# Patient Record
Sex: FEMALE | Race: WHITE
[De-identification: names, ages, dates, MRNs, and addresses within clinical notes are randomized per-mention and may not be internally consistent; named-entity substitution may affect disease eponyms.]

---

## 2019-09-06 ENCOUNTER — HOSPITAL ENCOUNTER (EMERGENCY)
Dept: HOSPITAL 61 - ER | Age: 44
Discharge: HOME | End: 2019-09-06
Payer: COMMERCIAL

## 2019-09-06 VITALS — DIASTOLIC BLOOD PRESSURE: 94 MMHG | SYSTOLIC BLOOD PRESSURE: 142 MMHG

## 2019-09-06 VITALS — BODY MASS INDEX: 39.68 KG/M2 | HEIGHT: 72 IN | WEIGHT: 293 LBS
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
## 2019-09-06 DIAGNOSIS — S60.410A: Primary | ICD-10-CM

## 2019-09-06 DIAGNOSIS — R51: ICD-10-CM
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
## 2019-09-06 DIAGNOSIS — J45.909: ICD-10-CM

## 2019-09-06 DIAGNOSIS — S09.90XA: ICD-10-CM

## 2019-09-06 DIAGNOSIS — Z88.0: ICD-10-CM
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
## 2019-09-06 DIAGNOSIS — M79.652: ICD-10-CM

## 2019-09-06 PROCEDURE — 90715 TDAP VACCINE 7 YRS/> IM: CPT

## 2019-09-06 PROCEDURE — 73552 X-RAY EXAM OF FEMUR 2/>: CPT

## 2019-09-06 PROCEDURE — 99284 EMERGENCY DEPT VISIT MOD MDM: CPT

## 2019-09-06 PROCEDURE — 90471 IMMUNIZATION ADMIN: CPT

## 2019-09-06 PROCEDURE — 70450 CT HEAD/BRAIN W/O DYE: CPT

## 2019-09-06 NOTE — RAD
LEFT FEMUR XRAY

 

History: Fall. Pain left hip.

 

Technique: 2 views left femur.

 

Comparison: None.

 

Findings:

Advanced left hip degenerative changes with marginal osteophyte formation,

subchondral sclerosis and joint space narrowing. Increased sclerosis of 

the femoral head, may represent avascular necrosis. Normal alignment. No 

fracture. Pubic symphysis DJD. Tricompartment knee DJD partially imaged.

 

Impression: 

1.  No acute osseous abnormality.

2.  Increased sclerosis the left femoral head, may represent avascular 

necrosis.

3.  Left hip and knee DJD.

 

Electronically signed by: Neri Canela DO (9/6/2019 2:07 PM) [facility]

## 2019-09-06 NOTE — RAD
CT HEAD WO CONTRAST

 

Indication: Hit head after a fall.

 

Exposure: One or more of the following individualized dose reduction 

techniques were utilized for this examination:  1. Automated exposure 

control  2. Adjustment of the mA and/or kV according to patient size  3. 

Use of iterative reconstruction technique.

 

Technique: Standard imaging without intravenous contrast.

 

Comparison: None

 

FINDINGS:

No evidence of acute intracranial hemorrhage, mass effect, midline shift 

or abnormal extra-axial fluid collection. Gray-white matter distinction is

intact. Ventricles and sulci are symmetric. Visualized orbits 

unremarkable. No significant scalp swelling or hematoma. Partially 

visualized sinuses are clear. No evidence of depressed skull fracture 

although a specific location of tenderness or pain is not known.

 

IMPRESSION: No evidence of acute intracranial hemorrhage.

 

Electronically signed by: Miguel Angel Stephen MD (9/6/2019 2:37 PM) [facility]

## 2019-09-06 NOTE — PHYS DOC
Past Medical History


Past Medical History:  Arthritis, Asthma, Fibromyalgia


Past Surgical History:  Cholecystectomy, Other


Additional Past Surgical Histo:  ankle fracture


Alcohol Use:  None


Drug Use:  None





Adult General


Chief Complaint


Chief Complaint:  MECHANICAL FALL





HPI


HPI





Patient is a 44  year old female who presents with complaining of fall. Patient 

states she had a fall from a standing position when she tripped on a rug and hit

the back of her head and right hand and left thigh without loss of 

consciousness. Patient rated her pain 7/10 and denies focal neuro deficit, 

nausea and vomiting, pregnancy, fever and chills, chest pain and shortness of 

breath. Patient is not up-to-date with tetanus immunization.





Review of Systems


Review of Systems





Constitutional: Denies fever or chills []


Eyes: Denies change in visual acuity, redness, or eye pain []


HENT: Denies nasal congestion or sore throat []


Respiratory: Denies cough or shortness of breath []


Cardiovascular: No additional information not addressed in HPI []


GI: Denies abdominal pain, nausea, vomiting, bloody stools or diarrhea []


: Denies dysuria or hematuria []


Musculoskeletal: Denies back pain, reports joint pain []


Integument: Denies rash or skin lesions []


Neurologic: Reports headache, denies focal weakness or sensory changes []


Endocrine: Denies polyuria or polydipsia []





All other systems were reviewed and found to be within normal limits, except as 

documented in this note.





Current Medications


Current Medications





Current Medications








 Medications


  (Trade)  Dose


 Ordered  Sig/Cali  Start Time


 Stop Time Status Last Admin


Dose Admin


 


 Diphtheria/


 Tetanus/Acell


 Pertussis


  (Boostrix)  0.5 ml  ONCE ONCE  19 13:45


 19 13:46 DC 19 13:55


0.5 ML


 


 Ibuprofen


  (Motrin)  800 mg  1X  ONCE  19 14:30


 19 14:31 DC  














Allergies


Allergies





Allergies








Coded Allergies Type Severity Reaction Last Updated Verified


 


  Penicillins Allergy Intermediate hives 9/3/15 No











Physical Exam


Physical Exam








Constitutional: Well developed, well nourished, mild distress, non-toxic a

ppearance, morbidly obese. []


HENT: Normocephalic, small area of contusion in right side of occipital area 

without fluctuation, normal ENT exam


Eyes: PERRLA, EOMI, conjunctiva normal, no discharge. [] 


Neck: Normal range of motion, no tenderness, supple, no stridor. [] 


Cardiovascular:Heart rate regular rhythm, no murmur []


Lungs & Thorax:  Bilateral breath sounds clear to auscultation []


Abdomen: Bowel sounds normal, soft, no tenderness, no masses, no pulsatile 

masses. [] 


Skin: Warm, dry, no erythema, no rash. [] 


Back: No tenderness, no CVA tenderness. [] 


Extremities: Left thigh without sign of injury or contusion or limited range of 

motion No tenderness, small superficial abrasion of right index finger without 

active bleeding, no cyanosis, no clubbing, ROM intact.


Neurologic: Alert and oriented X 3, no focal deficits noted. []


Psychologic: Affect normal, judgement normal, mood normal. []





Current Patient Data


Vital Signs





                                   Vital Signs








  Date Time  Temp Pulse Resp B/P (MAP) Pulse Ox O2 Delivery O2 Flow Rate FiO2


 


19 13:19 98.7 95 18 142/94 (110) 96 Room Air  





 98.7       











EKG


EKG


[]





Radiology/Procedures


Radiology/Procedures


[][facility]


                    [address] [place] [address]


                                 [phone]


                                        


                                 IMAGING REPORT





                                     Signed





PATIENT: INO VARGAS SACCOUNT: [id number]     MRN#: [id number]


: 1975           LOCATION: ER              AGE: 44


SEX: F                    EXAM DT: 19         ACCESSION#: [id number]


STATUS: REG ER            ORD. PHYSICIAN: YUN MARTINI MD


REASON: fall, HIT RIGHT SIDE OF HEAD


PROCEDURE: CT HEAD WO CONTRAST





CT HEAD WO CONTRAST


 


Indication: Hit head after a fall.


 


Exposure: One or more of the following individualized dose reduction 


techniques were utilized for this examination:  1. Automated exposure 


control  2. Adjustment of the mA and/or kV according to patient size  3. 


Use of iterative reconstruction technique.


 


Technique: Standard imaging without intravenous contrast.


 


Comparison: None


 


FINDINGS:


No evidence of acute intracranial hemorrhage, mass effect, midline shift 


or abnormal extra-axial fluid collection. Gray-white matter distinction is


intact. Ventricles and sulci are symmetric. Visualized orbits 


unremarkable. No significant scalp swelling or hematoma. Partially 


visualized sinuses are clear. No evidence of depressed skull fracture 


although a specific location of tenderness or pain is not known.


 


IMPRESSION: No evidence of acute intracranial hemorrhage.


 


Electronically signed by: Miguel Angel Stephen MD (2019 2:37 PM) [facility]














DICTATED and SIGNED BY:     MIGUEL ANGEL STEPHEN MD


DATE:     19 1438


                            [facility]


                    [address] [place] [address]


                                 [phone]


                                        


                                 IMAGING REPORT





                                     Signed





PATIENT: INO VARGASOUNT: [id number]     MRN#: [id number]


: 1975           LOCATION: ER              AGE: 44


SEX: F                    EXAM DT: 19         ACCESSION#: [id number]


STATUS: REG ER            ORD. PHYSICIAN: YUN MARTINI MD


REASON: fall. pain from left hip to knee


PROCEDURE: LEFT FEMUR XRAY





LEFT FEMUR XRAY


 


History: Fall. Pain left hip.


 


Technique: 2 views left femur.


 


Comparison: None.


 


Findings:


Advanced left hip degenerative changes with marginal osteophyte formation,


subchondral sclerosis and joint space narrowing. Increased sclerosis of 


the femoral head, may represent avascular necrosis. Normal alignment. No 


fracture. Pubic symphysis DJD. Tricompartment knee DJD partially imaged.


 


Impression: 


1.  No acute osseous abnormality.


2.  Increased sclerosis the left femoral head, may represent avascular 


necrosis.


3.  Left hip and knee DJD.


 


Electronically signed by: Neri Canela DO (2019 2:07 PM) [facility]














DICTATED and SIGNED BY:     NERI CANELA DO


DATE:     19 1407





Course & Med Decision Making


Course & Med Decision Making


Pertinent Labs and Imaging studies reviewed. (See chart for details)





[]





Dragon Disclaimer


Dragon Disclaimer


This electronic medical record was generated, in whole or in part, using a voice

 recognition dictation system.





Departure


Departure


Impression:  


   Primary Impression:  


   Head injury


   Additional Impressions:  


   Fall


   Finger abrasion


   Left thigh pain


Disposition:  01 HOME, SELF-CARE (at 1446)


Condition:  IMPROVED


Referrals:  


UNKNOWN PCP NAME (PCP)


Patient Instructions:  Abrasions, Contusion, Head Injury, Adult





Additional Instructions:  


Drink plenty of liquids


Follow-up with your primary care physician in 3-5 days


Return to ER if not getting better


Apply ice on the affected area


Scripts


Ibuprofen (IBUPROFEN) 800 Mg Tablet


800 MG PO PRN Q8HRS PRN for INFLAMMATION, #20 TAB


   Prov: YUN MARTINI MD         19





Problem Qualifiers








   Primary Impression:  


   Head injury


   Encounter type:  initial encounter  Qualified Codes:  S09.90XA - Unspecified 

   injury of head, initial encounter


   Additional Impressions:  


   Fall


   Encounter type:  subsequent encounter  Qualified Codes:  W19.XXXD - 

   Unspecified fall, subsequent encounter


   Finger abrasion


   Encounter type:  sequela  Qualified Codes:  S60.419S - Abrasion of 

   unspecified finger, sequela








YUN MARTINI MD              Sep 6, 2019 13:45

## 2021-04-29 ENCOUNTER — HOSPITAL ENCOUNTER (EMERGENCY)
Dept: HOSPITAL 61 - ER | Age: 46
Discharge: HOME | End: 2021-04-29
Payer: COMMERCIAL

## 2021-04-29 VITALS — BODY MASS INDEX: 39.68 KG/M2 | HEIGHT: 72 IN | WEIGHT: 293 LBS

## 2021-04-29 VITALS — SYSTOLIC BLOOD PRESSURE: 141 MMHG | DIASTOLIC BLOOD PRESSURE: 71 MMHG

## 2021-04-29 DIAGNOSIS — Z88.0: ICD-10-CM

## 2021-04-29 DIAGNOSIS — N30.00: Primary | ICD-10-CM

## 2021-04-29 DIAGNOSIS — A59.9: ICD-10-CM

## 2021-04-29 DIAGNOSIS — F17.200: ICD-10-CM

## 2021-04-29 DIAGNOSIS — G89.29: ICD-10-CM

## 2021-04-29 DIAGNOSIS — Z90.49: ICD-10-CM

## 2021-04-29 DIAGNOSIS — M79.7: ICD-10-CM

## 2021-04-29 DIAGNOSIS — J45.909: ICD-10-CM

## 2021-04-29 DIAGNOSIS — M16.0: ICD-10-CM

## 2021-04-29 LAB
AMORPH SED URNS QL MICRO: PRESENT /HPF
AMPHETAMINE/METHAMPHETAMINE: (no result)
APTT PPP: (no result) S
BACTERIA #/AREA URNS HPF: (no result) /HPF
BARBITURATES UR-MCNC: (no result) UG/ML
BENZODIAZ UR-MCNC: (no result) UG/L
BILIRUB UR QL STRIP: (no result)
CANNABINOIDS UR-MCNC: (no result) UG/L
COCAINE UR-MCNC: (no result) NG/ML
FIBRINOGEN PPP-MCNC: (no result) MG/DL
METHADONE SERPL-MCNC: (no result) NG/ML
NITRITE UR QL STRIP: NEGATIVE
OPIATES UR-MCNC: (no result) NG/ML
PCP SERPL-MCNC: (no result) MG/DL
PH UR STRIP: 5.5 [PH]
PROT UR STRIP-MCNC: NEGATIVE MG/DL
RBC #/AREA URNS HPF: (no result) /HPF (ref 0–2)
T VAGINALIS URNS QL MICRO: PRESENT
UROBILINOGEN UR-MCNC: 0.2 MG/DL

## 2021-04-29 PROCEDURE — 99284 EMERGENCY DEPT VISIT MOD MDM: CPT

## 2021-04-29 PROCEDURE — 96372 THER/PROPH/DIAG INJ SC/IM: CPT

## 2021-04-29 PROCEDURE — 80307 DRUG TEST PRSMV CHEM ANLYZR: CPT

## 2021-04-29 PROCEDURE — 81001 URINALYSIS AUTO W/SCOPE: CPT

## 2021-04-29 PROCEDURE — 71045 X-RAY EXAM CHEST 1 VIEW: CPT

## 2021-04-29 PROCEDURE — 87086 URINE CULTURE/COLONY COUNT: CPT

## 2021-04-29 PROCEDURE — 73521 X-RAY EXAM HIPS BI 2 VIEWS: CPT

## 2021-04-29 NOTE — PHYS DOC
Past Medical History


Past Medical History:  Arthritis, Asthma, Fibromyalgia


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


Past Surgical History:  Cholecystectomy, Other


Additional Past Surgical Histo:  ankle fracture


Smoking Status:  Current Every Day Smoker


Alcohol Use:  None


Drug Use:  None





General Adult


EDM:


Chief Complaint:  OTHER COMPLAINTS





HPI:


HPI:





Patient is a 45  year old female who presents to the ED today complaining of 

moderate bilateral hip pain chronic in nature, patient states for the last 

couple days she has not been able to bear weight fully due to chronic pain.  She

states she used to take gabapentin, Robaxin and naproxen but has not had this 

prescriptions for a while.  Patient denies any known injury.  Describes the pain

as sharp constant and worse on weightbearing.  She is also complaining of a 

flareup of her fibromyalgia.   She states currently she has so much pain 

throughout her body that she is not able to take care of her 3-year-old child 

which she took to the mother in law.  She states she needs her hip replaced





Review of Systems:


Review of Systems:


Constitutional:   Denies fever or chills. []


Eyes:   Denies change in visual acuity. []


HENT:   Denies nasal congestion or sore throat. [] 


Respiratory:   Denies cough or shortness of breath. [] 


Cardiovascular:   Denies chest pain or edema. [] 


GI:   Denies abdominal pain, nausea, vomiting, bloody stools or diarrhea. [] 


:  Denies dysuria. [] 


Musculoskeletal: Reports bilateral hip pain, chronic generalized pain from 

fibromyalgia, denies back pain 


Integument:   Denies rash. [] 


Neurologic:   Denies headache, focal weakness or sensory changes. [] 


Psychiatric:  Denies depression or anxiety. []





Heart Score:


C/O Chest Pain:  N/A


Risk Factors:


Risk Factors:  DM, Current or recent (<one month) smoker, HTN, HLP, family 

history of CAD, obesity.


Risk Scores:


Score 0 - 3:  2.5% MACE over next 6 weeks - Discharge Home


Score 4 - 6:  20.3% MACE over next 6 weeks - Admit for Clinical Observation


Score 7 - 10:  72.7% MACE over next 6 weeks - Early Invasive Strategies





Current Medications:





Current Medications








 Medications


  (Trade)  Dose


 Ordered  Sig/Cali  Start Time


 Stop Time Status Last Admin


Dose Admin


 


 Acetaminophen/


 Hydrocodone Bitart


  (Lortab 5/325)  2 tab  1X  ONCE  4/29/21 19:00


 4/29/21 19:01 DC 4/29/21 19:05


2 TAB


 


 Ceftriaxone Sodium


  (Rocephin Im)  500 mg  1X  ONCE  4/29/21 19:30


 4/29/21 19:31 DC  





 


 Cyclobenzaprine


 HCl


  (Flexeril)  10 mg  1X  ONCE  4/29/21 19:00


 4/29/21 19:01 DC 4/29/21 19:03


10 MG


 


 Doxycycline


 Hyclate


  (Vibra-Tab)  100 mg  1X  ONCE  4/29/21 19:30


 4/29/21 19:31 DC  





 


 Metronidazole


  (Flagyl)  2,000 mg  1X  ONCE  4/29/21 19:30


 4/29/21 19:31 DC  





 


 Naproxen


  (Naprosyn)  500 mg  1X  STAT  4/29/21 18:46


 4/29/21 18:53 DC 4/29/21 19:03


500 MG


 


 Prednisone


  (Prednisone)  50 mg  1X  ONCE  4/29/21 19:30


 4/29/21 19:31 DC  














Allergies:


Allergies:





Allergies








Coded Allergies Type Severity Reaction Last Updated Verified


 


  Penicillins Allergy Intermediate hives 9/3/15 No











Physical Exam:


PE:





Constitutional: Dirty appearing morbidly obese patient, no acute distress, non-

toxic appearance. []


HENT: Normocephalic, atraumatic, bilateral external ears normal, oropharynx 

moist, no oral exudates, nose normal. []


Eyes: PERRLA, EOMI, conjunctiva normal, no discharge. [] 


Neck: Normal range of motion, no tenderness, supple, no stridor. [] 


Cardiovascular:Heart rate regular rhythm, no murmur []


Lungs & Thorax:  Bilateral breath sounds clear to auscultation []


Abdomen: Bowel sounds normal, soft, no tenderness, no masses, no pulsatile 

masses. [] 


Skin: Warm, dry, no erythema, no rash. [] 


Back: No tenderness, no CVA tenderness. [] 


Extremities: No tenderness, no cyanosis, no clubbing, limited range of motion to

 bilateral lower extremities specifically around the hips due to pain.  Chronic 

bilateral lower extremity +2 edema


Neurologic: Alert and oriented X 3, normal motor function, normal sensory 

function, no focal deficits noted. []


Psychologic: Affect normal, judgement normal, mood normal. []





Current Patient Data:


Labs:





                                Laboratory Tests








Test


 4/29/21


18:51


 


Urine Collection Type Unknown  


 


Urine Color Teresita  


 


Urine Clarity Cloudy  


 


Urine pH


 5.5 (<5.0-8.0)





 


Urine Specific Gravity


 1.025


(1.000-1.030)


 


Urine Protein


 Negative mg/dL


(NEG-TRACE)


 


Urine Glucose (UA)


 Negative mg/dL


(NEG)


 


Urine Ketones (Stick)


 Negative mg/dL


(NEG)


 


Urine Blood


 Negative (NEG)





 


Urine Nitrite


 Negative (NEG)





 


Urine Bilirubin Small (NEG)  


 


Urine Urobilinogen Dipstick


 0.2 mg/dL (0.2


mg/dL)


 


Urine Leukocyte Esterase Small (NEG)  


 


Urine RBC


 1-2 /HPF (0-2)





 


Urine WBC


 11-20 /HPF


(0-4)


 


Urine Squamous Epithelial


Cells Many /LPF  





 


Urine Amorphous Sediment Present /HPF  


 


Urine Bacteria


 Few /HPF


(0-FEW)


 


Urine Mucus Marked /LPF  


 


Urine Trichomonas Present  


 


Urine Opiates Screen Neg (NEG)  


 


Urine Methadone Screen Neg (NEG)  


 


Urine Barbiturates Neg (NEG)  


 


Urine Phencyclidine Screen Neg (NEG)  


 


Urine


Amphetamine/Methamphetamine Neg (NEG)  





 


Urine Benzodiazepines Screen Neg (NEG)  


 


Urine Cocaine Screen Neg (NEG)  


 


Urine Cannabinoids Screen Neg (NEG)  


 


Urine Ethyl Alcohol Neg (NEG)  








Vital Signs:





                                   Vital Signs








  Date Time  Temp Pulse Resp B/P (MAP) Pulse Ox O2 Delivery O2 Flow Rate FiO2


 


4/29/21 19:05   23  97 Room Air  


 


4/29/21 16:15 99.8 108  164/105 (124)    





 99.8       











EKG:


EKG:


[]





Radiology/Procedures:


Radiology/Procedures:


[]PROCEDURE: HIP BILATERAL WITH PELVIS





EXAM: Frontal pelvis with bilateral hips.





HISTORY: Pain, edema.





COMPARISON: None.





FINDINGS: There is severe right hip osteoarthritis. The superior joint space is 

effaced with mild flattening of the superior aspect of the femoral head. There 

is subchondral sclerosis with small subchondral cysts.





On the left, there also is severe osteoarthritis. There is collapse of the 

superior aspect of the femoral head with significant volume loss and a component

 of superolateral subluxation.





No fractures are identified. There are mild-to-moderate degenerative changes of 

the lower lumbar spine.





IMPRESSION: 


1. Severe bilateral hip osteoarthritis. There is collapse of left femoral head 

with superolateral subluxation. The right femoral head is mildly flattened. 

These findings are consistent with a component of either primary or secondary 

avascular necrosis.





Electronically signed by: KELLY Olivarez MD (4/29/2021 6:21 PM) [id number]














DICTATED and SIGNED BY:     KASHMIR OLIVAREZ MD


DATE:     04/29/21 1819MTH0 0


PROCEDURE: CHEST AP ONLY





EXAM: AP View of the chest





DATE: 4/29/2021 6:03 PM





INDICATION: Reason: edema / Spl. Instructions:  / History: 





COMPARISON: No Prior





FINDINGS/


IMPRESSION:


Heart is mildly enlarged. Mediastinal and hilar contours are grossly 

unremarkable.





Accounting for low lung volumes, no lobar consolidation or focal parenchymal 

airspace opacity.





No pleural effusion or pneumothorax.





Electronically signed by: Heber Fish MD (4/29/2021 6:18 PM) [facility]














DICTATED and SIGNED BY:     HEBER FISH MD


DATE:     04/29/21 1817MTH0 0





Course & Med Decision Making:


Course & Med Decision Making


Pertinent Labs and Imaging studies reviewed. (See chart for details)





This is a 45-year-old female patient presented to the ED today complaining of 

bilateral hip pain, pain is chronic, no known injury.  Patient requesting 

bilateral hip replacements.





Patient is also complaining of chronic pain throughout her body from 

fibromyalgia.





She was noted to be running a slight temp of 99.6





Urine positive for UTI, urine also positive for trichomonas.  She was treated 

and educated on STDs.





Bilateral hip x-rays were noted for severe osteoarthritis.





I spoke to Dr. Jain.  Patient was provided instructions to follow-up with him

 in the clinic.





Discharge on cephalexin for UTI and doxycycline to complete STD treatment





Dragon Disclaimer:


Dragon Disclaimer:


This electronic medical record was generated, in whole or in part, using a voice

 recognition dictation system.





Departure


Departure


Impression:  


   Primary Impression:  


   Degenerative joint disease (DJD) of hip


   Qualified Codes:  M16.0 - Bilateral primary osteoarthritis of hip


   Additional Impressions:  


   Fibromyalgia


   Trichomonas infection


   Urinary tract infection


   Qualified Codes:  N30.00 - Acute cystitis without hematuria


Disposition:  01 HOME / SELF CARE / HOMELESS


Condition:  STABLE


Referrals:  


NO PCP (PCP)








ALBER JAIN MD


follow up in the course of this week


Patient Instructions:  Arthritis, Degenerative-Brief, Fibromyalgia, T

richomoniasis-Brief, Urinary Tract Infection





Additional Instructions:  


You were evaluated in the emergency room and noted to have severe osteoarthritis

 to your hips.  We provided you an orthopedic doctor, contact his office 

tomorrow morning and set up a follow-up appointment.  You also have urinary 

tract infection and trichomonas, trichomonas is a sexually transmitted disease 

you were treated in the emergency room.  Please complete the rest of your 

antibiotics.  Please follow-up with the health department for STD concerns.  

Please contact your partner/s and asked him to seek treatment


Scripts


Gabapentin (GABAPENTIN) 600 Mg Tablet


600 MG PO TID for NEUROGENIC PAIN, #30 TAB


   Prov: WESTLEY TREVIÑO APRN         4/29/21 


Diclofenac Potassium (DICLOFENAC POTASSIUM) 50 Mg Tablet


1 TAB PO BID, #20 TAB 0 Refills


   Prov: WESTLEY TREVIÑO         4/29/21 


Methocarbamol (METHOCARBAMOL) 500 Mg Tablet


500 MG PO TID, #30 TAB


   Prov: WESTLEY TREVIÑO         4/29/21 


Doxycycline Hyclate (DOXYCYCLINE HYCLATE) 100 Mg Tablet


1 TAB PO BID, #14 TAB


   Prov: WESTLEY TREVIÑO APRN         4/29/21 


Cephalexin (CEPHALEXIN) 500 Mg Tablet


1 TAB PO BID, #14 TAB


   Prov: WESTLEY TREVIÑO APRN         4/29/21











WESTLEY TREVIÑO            Apr 29, 2021 19:51

## 2021-04-29 NOTE — RAD
EXAM: Frontal pelvis with bilateral hips.



HISTORY: Pain, edema.



COMPARISON: None.



FINDINGS: There is severe right hip osteoarthritis. The superior joint space is effaced with mild fla
ttening of the superior aspect of the femoral head. There is subchondral sclerosis with small subchon
dral cysts.



On the left, there also is severe osteoarthritis. There is collapse of the superior aspect of the fem
oral head with significant volume loss and a component of superolateral subluxation.



No fractures are identified. There are mild-to-moderate degenerative changes of the lower lumbar spin
e.



IMPRESSION: 

1. Severe bilateral hip osteoarthritis. There is collapse of left femoral head with superolateral sub
luxation. The right femoral head is mildly flattened. These findings are consistent with a component 
of either primary or secondary avascular necrosis.



Electronically signed by: KELLY Olivarez MD (4/29/2021 6:21 PM) [id number]

## 2021-04-29 NOTE — RAD
EXAM: AP View of the chest



DATE: 4/29/2021 6:03 PM



INDICATION: Reason: edema / Spl. Instructions:  / History: 



COMPARISON: No Prior



FINDINGS/

IMPRESSION:

Heart is mildly enlarged. Mediastinal and hilar contours are grossly unremarkable.



Accounting for low lung volumes, no lobar consolidation or focal parenchymal airspace opacity.



No pleural effusion or pneumothorax.



Electronically signed by: Heber Gray MD (4/29/2021 6:18 PM) [facility]